# Patient Record
Sex: MALE | Race: WHITE | NOT HISPANIC OR LATINO | URBAN - METROPOLITAN AREA
[De-identification: names, ages, dates, MRNs, and addresses within clinical notes are randomized per-mention and may not be internally consistent; named-entity substitution may affect disease eponyms.]

---

## 2019-09-09 ENCOUNTER — OFFICE VISIT (OUTPATIENT)
Dept: URBAN - METROPOLITAN AREA CLINIC 32 | Facility: CLINIC | Age: 59
End: 2019-09-09
Payer: COMMERCIAL

## 2019-09-09 PROCEDURE — 92012 INTRM OPH EXAM EST PATIENT: CPT | Performed by: OPHTHALMOLOGY

## 2019-09-09 PROCEDURE — 92004 COMPRE OPH EXAM NEW PT 1/>: CPT | Performed by: OPHTHALMOLOGY

## 2019-09-09 ASSESSMENT — INTRAOCULAR PRESSURE
OD: 19
OS: 19

## 2019-09-09 NOTE — IMPRESSION/PLAN
Impression: Vitreous hemorrhage, left eye: H43.12. Plan: Diabetic VH OS for the last week -- retina appears attached -- to retina for bScan (unavailable today) within 1-2 days OD will likely need FA / Theresa Adams

## 2019-09-10 ENCOUNTER — OFFICE VISIT (OUTPATIENT)
Dept: URBAN - METROPOLITAN AREA CLINIC 33 | Facility: CLINIC | Age: 59
End: 2019-09-10
Payer: COMMERCIAL

## 2019-09-10 DIAGNOSIS — H34.8321 TRIB RTNL VEIN OCCLUSION, LEFT EYE, W RTNL NEOVAS: ICD-10-CM

## 2019-09-10 PROCEDURE — 67028 INJECTION EYE DRUG: CPT | Performed by: OPHTHALMOLOGY

## 2019-09-10 PROCEDURE — 99215 OFFICE O/P EST HI 40 MIN: CPT | Performed by: OPHTHALMOLOGY

## 2019-09-10 PROCEDURE — 99204 OFFICE O/P NEW MOD 45 MIN: CPT | Performed by: OPHTHALMOLOGY

## 2019-09-10 PROCEDURE — 92134 CPTRZ OPH DX IMG PST SGM RTA: CPT | Performed by: OPHTHALMOLOGY

## 2019-09-10 ASSESSMENT — INTRAOCULAR PRESSURE
OD: 16
OS: 17

## 2019-09-10 NOTE — IMPRESSION/PLAN
Impression: Vitreous hemorrhage, left eye: H43.12. Plan: OCT ordered and performed today, Discussed diagnosis in detail with patient. Discussed treatment options with patient. Discussed a one time Avastin OS injection today  W/ PRP vs PPVx recommend re-eval in 1 mth to determine further treatment. Discussed r/b/a of injection and laser. Pt understands and elects to proceed W/ injection today. Advised pt to start back up on blood thinner as pt was in hospital & they d/c.

## 2019-10-15 ENCOUNTER — OFFICE VISIT (OUTPATIENT)
Dept: URBAN - METROPOLITAN AREA CLINIC 33 | Facility: CLINIC | Age: 59
End: 2019-10-15
Payer: COMMERCIAL

## 2019-10-15 DIAGNOSIS — H43.12 VITREOUS HEMORRHAGE, LEFT EYE: Primary | ICD-10-CM

## 2019-10-15 PROCEDURE — 99213 OFFICE O/P EST LOW 20 MIN: CPT | Performed by: OPHTHALMOLOGY

## 2019-10-15 PROCEDURE — 92134 CPTRZ OPH DX IMG PST SGM RTA: CPT | Performed by: OPHTHALMOLOGY

## 2019-10-15 ASSESSMENT — INTRAOCULAR PRESSURE
OS: 15
OD: 17

## 2019-10-15 NOTE — IMPRESSION/PLAN
Impression: Vitreous hemorrhage, left eye: H43.12. Plan: OCT ordered and performed today. Discussed diagnosis with patient. The clinical exam is consistent with Non clearing Vitreous Hemorrhage. Discussed treatment options with patient. Observation vs Vitrectomy. Recommend sx to clear Vitreous Hemorrhage. After a through discussion of surgical R/B/A, the patient elects to proceed with sx. The patient understands the potential risks of sx, including (but not limited to) bleeding, pain, infection, loss of vision, loss of eye and possible need for more sx. RL-2.